# Patient Record
(demographics unavailable — no encounter records)

---

## 2025-01-02 NOTE — ASSESSMENT
[FreeTextEntry1] : 3 y/o male here c/o itchy mouth, vomiting, hives after eating gluten free products containing tapioca flour, arrowroot, cashew, sunflower seeds, flax seeds and coconut.  Mother concerned about tapioca allergy.  Less likely due to tapioca as it does not contain any protein.    Suggest  - Will send for Immunocaps to [place], sunflower seeds, flax seed and coconut  - If Immunocaps negative consider Fresh ST to tapioca flour and arrowroot flower  Will call back with results.   Total time spent 30 minutes on the date of the encounter. This included time devoted to preparing to see the patient with review of previous medical record, obtaining medical history, performing physical exam, counseling and patient education with patient and family, ordering medications and lab studies, documentation in the medical record and coordination of care. The time spent is separate from time spent on separately billed procedures.

## 2025-01-02 NOTE — HISTORY OF PRESENT ILLNESS
[Asthma] : asthma [Allergic Rhinitis] : allergic rhinitis [Eczematous rashes] : eczematous rashes [Drug Allergies] : drug allergies [de-identified] : 5 y/o last seen 03/16/2023 here c/o food reactions after eating gluten free baked products.   One month ago pt had gluten free cookies - immediately after developed itchy mouth and vomited. After vomiting pt was feeling better but child was given Benadryl.  Ingredients listed - tapioca flour, arrow root, cashew, sunflower seeds, flaxseed and coconut.   Last week pt ate gluten free bread and immediately after developed itchy rash over his body - Benadryl was given and rash resolved by next morning.  Mom showed digital images c/w hives.  Ingredients listed - tapioca flour Prior to the reaction patient denies being sick or using any new products.   Since last reaction pt has been avoiding gluten free products. Mom is concerned about tapioca flour allergy  Last visit mother stated that child had 5-6 episodes of tongue pain, vomiting and immediate transient facial erythema after eating various foods - mother was advised by history no evidence of IgE mediated allergy. Mom feels pt did have gluten free products when he had previous reactions but not sure.

## 2025-01-02 NOTE — HISTORY OF PRESENT ILLNESS
[Asthma] : asthma [Allergic Rhinitis] : allergic rhinitis [Eczematous rashes] : eczematous rashes [Drug Allergies] : drug allergies [de-identified] : 5 y/o last seen 03/16/2023 here c/o food reactions after eating gluten free baked products.   One month ago pt had gluten free cookies - immediately after developed itchy mouth and vomited. After vomiting pt was feeling better but child was given Benadryl.  Ingredients listed - tapioca flour, arrow root, cashew, sunflower seeds, flaxseed and coconut.   Last week pt ate gluten free bread and immediately after developed itchy rash over his body - Benadryl was given and rash resolved by next morning.  Mom showed digital images c/w hives.  Ingredients listed - tapioca flour Prior to the reaction patient denies being sick or using any new products.   Since last reaction pt has been avoiding gluten free products. Mom is concerned about tapioca flour allergy  Last visit mother stated that child had 5-6 episodes of tongue pain, vomiting and immediate transient facial erythema after eating various foods - mother was advised by history no evidence of IgE mediated allergy. Mom feels pt did have gluten free products when he had previous reactions but not sure.

## 2025-01-02 NOTE — PHYSICAL EXAM
[Alert] : alert [Well Nourished] : well nourished [No Acute Distress] : no acute distress [Well Developed] : well developed [Normal Rate and Effort] : normal respiratory rhythm and effort [Normal Rate] : heart rate was normal  [Skin Intact] : skin intact  [Conjunctival Erythema] : no conjunctival erythema [Pale mucosa] : no pale mucosa [Boggy Nasal Turbinates] : no boggy and/or pale nasal turbinates [Pharyngeal erythema] : no pharyngeal erythema [Posterior Pharyngeal Cobblestoning] : no posterior pharyngeal cobblestoning [Clear Rhinorrhea] : no clear rhinorrhea was seen [Wheezing] : no wheezing was heard

## 2025-01-02 NOTE — REASON FOR VISIT
[Routine Follow-Up] : a routine follow-up visit for [To Food] : allergy to food [Rash] : rash [Hives] : hives [Mother] : mother

## 2025-01-27 NOTE — ASSESSMENT
[FreeTextEntry1] : 3 y/o male here c/o itchy mouth, vomiting, hives after eating gluten free products containing tapioca flour, arrowroot, cashew, sunflower seeds, flax seeds and coconut.  Mother concerned about tapioca allergy.  Immunocaps for [place], flaxseed and sunflower seeds discussed in detail with mother  ST today - Almonds 4mm, [place] 5mm  Tapioca and Arrow root flour neg   Suggest - Avoid walnut/pecan and almond for now - carry EpiPen for emergencies - Mother advised to  EpiPen from the pharmacy.  - Consider almond and Hazelnut challenge in office - each done at separate visit - would do almond before hazelnut - Ok to eat flaxseed, [place] nut, coconut, Cashew, Pistachio, Pine nut and Macadamia nut Mom to consider avoiding these gluten free products in general - no need for child to have - Ok to try Tapioca and Arrow root flour at home - Consider ST for sunflower seeds - mom will make appointment - avoid until ST is done   Total time spent 35 minutes on the date of the encounter. This included time devoted to preparing to see the patient with review of previous medical record, obtaining medical history, performing physical exam, counseling and patient education with patient and family, ordering medications and lab studies, documentation in the medical record and coordination of care. The time spent is separate from time spent on separately billed procedures.

## 2025-01-27 NOTE — HISTORY OF PRESENT ILLNESS
[de-identified] : 3 y/o with c/o itchy mouth, vomiting, hives after eating gluten free products containing tapioca flour, arrowroot, cashew, sunflower seeds, flax seeds and coconut. Mother concerned about tapioca allergy - less likely due to tapioca/arrow root as tapioca starch/arrow root contains little protein.  Immunocaps 1/3/2025 1. [place] - Hazelnut - pos at 9.74 with pos cpn Cor a1 at 13.8, cor a9 and a14 neg - likely OAS - consider Nutella challenge in office - [place] - mild pos at 0.48 - consider ST and if low/neg consider oral challenge in office - [place] nut - Neg with neg cpn - ok to eat - Coconut - Neg - ok to eat - Pecan - Neg - ok to eat - [place] - pos at 1.19 with pos cpn at 0.53 - avoid - can consider ST  - Cashew nut - neg with neg cpn - ok to eat - Pistachio - Neg - ok to eat - Pine nut - neg - ok to eat - Macadamia nut - ok to eat  2. Seeds - Linseed (Flaxseed) - Neg - ok to eat - Sunflower seed - mildly pos at 0.49 - ? source of reaction - consider fresh ST to sunflower seeds  Mother here for ST  Denies any reactions since last visit. Pt was prescribed EpiPen, but mother never picked it up.   For past few days pt is c/o nasal congestion and runny nose which is getting better slowly. Mom states all family members has nasal congestion and runny nose at home.

## 2025-01-27 NOTE — PHYSICAL EXAM
[Alert] : alert [No Acute Distress] : no acute distress [Normal Pupil & Iris Size/Symmetry] : normal pupil and iris size and symmetry [Normal Lips/Tongue] : the lips and tongue were normal [Normal Outer Ear/Nose] : the ears and nose were normal in appearance [Pale mucosa] : pale mucosa [Boggy Nasal Turbinates] : boggy and/or pale nasal turbinates [Clear Rhinorrhea] : clear rhinorrhea was seen [Normal Rate and Effort] : normal respiratory rhythm and effort [Normal Rate] : heart rate was normal  [Skin Intact] : skin intact  [Conjunctival Erythema] : no conjunctival erythema [Pharyngeal erythema] : no pharyngeal erythema [Posterior Pharyngeal Cobblestoning] : no posterior pharyngeal cobblestoning [Wheezing] : no wheezing was heard

## 2025-01-27 NOTE — SOCIAL HISTORY
[House] : [unfilled] lives in a house  [Central Forced Air] : heating provided by central forced air [Central] : air conditioning provided by central unit [Dry] : dry [Bedroom] :  in bedroom [Dog] : dog [FreeTextEntry1] : lives with mom, dad,sisiter [employer] [Humidifier] : does not use a humidifier [Dehumidifier] : does not use a dehumidifier [Basement] : not in the basement [Living Area] : not in the living area [Smokers in Household] : there are no smokers in the home

## 2025-01-27 NOTE — REASON FOR VISIT
[Routine Follow-Up] : a routine follow-up visit for [Congestion] : congestion [Mother] : mother [FreeTextEntry3] : ? food allergy

## 2025-02-06 NOTE — HISTORY OF PRESENT ILLNESS
[de-identified] : 3 y/o last seen 01/27/2025 with c/o itchy mouth, vomiting, hives after eating gluten free products containing tapioca flour, arrowroot, cashew, sunflower seeds, flax seeds and coconut. Last visit mother concerned about tapioca allergy - less likely due to tapioca/arrow root as tapioca starch/arrow root contains little protein.  Fresh ST done and it was negative to tapioca and arrow root flour - advised to try at home, mother states pt had few gluten free products and child has been Ok.   ST 1/27/2025 - Tapioca and Arrow root - neg, Almonds 4mm, [place] 5mm  Immunocaps 1/3/2025 1. [place] - Hazelnut - pos at 9.74 with pos cpn Cor a1 at 13.8, cor a9 and a14 neg - likely OAS - consider Nutella challenge in office - [place] - mild pos at 0.48 - consider ST and if low/neg consider oral challenge in office - [place] nut - Neg with neg cpn - ok to eat - Coconut - Neg - ok to eat - Pecan - Neg - avoid due to child being positive for walnut.  - [place] - pos at 1.19 with pos cpn at 0.53 - avoid  - Cashew nut - neg with neg cpn - ok to eat - Pistachio - Neg - ok to eat - Pine nut - neg - ok to eat - Macadamia nut - ok to eat  2. Seeds - Linseed (Flaxseed) - Neg - ok to eat - Sunflower seed - mildly pos at 0.49 - ? source of reaction - consider fresh ST to sunflower seeds  After last visit mother was advised pt ok to eat flaxseed, [place] nut, Coconut, Cashew, Pistachio, Pine nut and Macadamia nut - mother has not tried any of these foods.  Mother advised to avoid walnut/pecan and almond - child has an up-to-date EpiPen  Mother here today 2/6/2025 for fresh ST for Sunflower seeds. Denies any reactions since last visit.

## 2025-02-06 NOTE — ASSESSMENT
[FreeTextEntry1] : 5 y/o male here c/o itchy mouth, vomiting, hives after eating gluten free products containing tapioca flour, arrowroot, cashew, sunflower seeds, flax seeds and coconut. Mother here for fresh ST to sunflower seeds   Immunocaps for [place], flaxseed and sunflower seeds discussed in detail with mother again  ST today - Sunflower 14mm - ? likely cause of reaction  ST 1/27/2024 - Almonds 4mm, [place] 5mm  Tapioca and Arrow root flour neg   Suggest - Avoid sunflower seeds, walnut/pecan and almond for now - carry EpiPen for emergencies - Mother advised to  EpiPen from the pharmacy.  - Consider almond challenge in office - Mother states pt has tried Hazelnut in past w/o any reactions - advised to try Hazelnut (Mark with hazelnut filling) at home and see how he does. Mother advised based on Immunocaps and previous tolerance risk of reaction is low. Mom expressed understanding and will try at home.  - Ok to eat flaxseed, [place] nut, coconut, Cashew, Pistachio, Pine nut and Macadamia nut Mom to consider avoiding these gluten free products in general - no need for child to have - Ok to try Tapioca and Arrow root flour at home  Discussed pt's continuing care pending physician's residential.   Total time spent 30 minutes on the date of the encounter. This included time devoted to preparing to see the patient with review of previous medical record, obtaining medical history, performing physical exam, counseling and patient education with patient and family, ordering medications and lab studies, documentation in the medical record and coordination of care. The time spent is separate from time spent on separately billed procedures.

## 2025-02-06 NOTE — PHYSICAL EXAM
[Alert] : alert [No Acute Distress] : no acute distress [Conjunctival Erythema] : no conjunctival erythema [Pale mucosa] : no pale mucosa [Boggy Nasal Turbinates] : no boggy and/or pale nasal turbinates [Pharyngeal erythema] : no pharyngeal erythema [Posterior Pharyngeal Cobblestoning] : no posterior pharyngeal cobblestoning [Clear Rhinorrhea] : no clear rhinorrhea was seen [Normal Rate and Effort] : normal respiratory rhythm and effort [Wheezing] : no wheezing was heard [Normal Rate] : heart rate was normal  [Skin Intact] : skin intact

## 2025-02-06 NOTE — REASON FOR VISIT
[Routine Follow-Up] : a routine follow-up visit for [FreeTextEntry3] : Sunflower seed scratch test (fresh) [Mother] : mother